# Patient Record
Sex: MALE | Race: BLACK OR AFRICAN AMERICAN | NOT HISPANIC OR LATINO | URBAN - METROPOLITAN AREA
[De-identification: names, ages, dates, MRNs, and addresses within clinical notes are randomized per-mention and may not be internally consistent; named-entity substitution may affect disease eponyms.]

---

## 2024-04-09 ENCOUNTER — EMERGENCY (EMERGENCY)
Facility: HOSPITAL | Age: 74
LOS: 1 days | Discharge: HOME | End: 2024-04-09
Attending: EMERGENCY MEDICINE | Admitting: EMERGENCY MEDICINE
Payer: MEDICARE

## 2024-04-09 VITALS
RESPIRATION RATE: 20 BRPM | SYSTOLIC BLOOD PRESSURE: 117 MMHG | DIASTOLIC BLOOD PRESSURE: 71 MMHG | TEMPERATURE: 98 F | HEART RATE: 63 BPM | OXYGEN SATURATION: 97 %

## 2024-04-09 VITALS
TEMPERATURE: 99 F | RESPIRATION RATE: 18 BRPM | SYSTOLIC BLOOD PRESSURE: 125 MMHG | HEART RATE: 54 BPM | DIASTOLIC BLOOD PRESSURE: 74 MMHG | OXYGEN SATURATION: 98 %

## 2024-04-09 PROCEDURE — 73590 X-RAY EXAM OF LOWER LEG: CPT | Mod: 26,RT,77

## 2024-04-09 PROCEDURE — 73590 X-RAY EXAM OF LOWER LEG: CPT | Mod: 26,RT

## 2024-04-09 PROCEDURE — 99284 EMERGENCY DEPT VISIT MOD MDM: CPT

## 2024-04-09 PROCEDURE — 73600 X-RAY EXAM OF ANKLE: CPT | Mod: 26,RT,59

## 2024-04-09 PROCEDURE — 73610 X-RAY EXAM OF ANKLE: CPT | Mod: 26,RT

## 2024-04-09 RX ORDER — ACETAMINOPHEN 500 MG
975 TABLET ORAL ONCE
Refills: 0 | Status: COMPLETED | OUTPATIENT
Start: 2024-04-09 | End: 2024-04-09

## 2024-04-09 RX ORDER — LIDOCAINE 4 G/100G
1 CREAM TOPICAL ONCE
Refills: 0 | Status: COMPLETED | OUTPATIENT
Start: 2024-04-09 | End: 2024-04-09

## 2024-04-09 RX ORDER — IBUPROFEN 200 MG
600 TABLET ORAL ONCE
Refills: 0 | Status: COMPLETED | OUTPATIENT
Start: 2024-04-09 | End: 2024-04-09

## 2024-04-09 RX ADMIN — Medication 975 MILLIGRAM(S): at 14:00

## 2024-04-09 RX ADMIN — Medication 600 MILLIGRAM(S): at 14:01

## 2024-04-09 RX ADMIN — LIDOCAINE 1 PATCH: 4 CREAM TOPICAL at 14:00

## 2024-04-09 NOTE — ED PROVIDER NOTE - PATIENT PORTAL LINK FT
You can access the FollowMyHealth Patient Portal offered by [facility] by registering at the following website: [email or web address]. By joining [employer]’s FollowMyHealth portal, you will also be able to view your health information using other applications (apps) compatible with our system.

## 2024-04-09 NOTE — ED PROVIDER NOTE - PHYSICAL EXAMINATION
Vital Signs Stable  Gen: well appearing, NAD  HEENT: no conjunctivitis  Cardiovascular: 2+ DP pulses b/l  Chest: unlabored breathing  Extremity: right medial malleolus posterior tip tenderness to palpation, right navicular tenderness, decreased dorsiflexion and plantarflexion ROM on right ankle, left ankle full ROM, 5/5 strength to dorsiflexion and plantarflexion b/l, good perfusion  Skin: no rash  Neuro: grossly normal

## 2024-04-09 NOTE — ED PROVIDER NOTE - CARE PROVIDER_API CALL
Royal Lee  Orthopaedic Surgery  [address]  [place] [address]  Phone: [phone]  Fax: [phone]  Follow Up Time: 4-6 Days    Kevin Abdi  Orthopaedic Surgery  [address]  [place] [address]  Phone: [phone]  Fax: [phone]  Follow Up Time: 4-6 Days

## 2024-04-09 NOTE — ED PROVIDER NOTE - PROVIDER TOKENS
PROVIDER:[TOKEN:[[id number]],FOLLOWUP:[4-6 Days]],PROVIDER:[TOKEN:[[id number]],FOLLOWUP:[4-6 Days]]

## 2024-04-09 NOTE — CONSULT NOTE ADULT - SUBJECTIVE AND OBJECTIVE BOX
Orthopedic Surgery Consult Note    73yMale p/w R ankle pain/deformity and inability to bear weight s/p mechanical fall at home where he twisted his ankle down 1 step. Denies headstrike/LOC. Denies numbness/tingling in the feet/toes. No other bone or joint complaints.     Vital Signs Last 24 Hrs  T(C): 37 (04-09-24 @ 16:26), Max: 37 (04-09-24 @ 16:26)  T(F): 98.6 (04-09-24 @ 16:26), Max: 98.6 (04-09-24 @ 16:26)  HR: 54 (04-09-24 @ 16:26) (54 - 63)  BP: 125/74 (04-09-24 @ 16:26) (117/71 - 125/74)  BP(mean): --  RR: 18 (04-09-24 @ 16:26) (18 - 20)  SpO2: 98% (04-09-24 @ 16:26) (97% - 98%)    Physical Exam  Gen: Nad  RLE:   Skin intact, +swelling, +ecchymosis  +TTP medial/lateral malleolus  Motor intact distally  SILT s/s/sp/dp/t  2+ DP    Secondary: No TTP over bony prominences. SILT b/l, ROM intact b/l. Distal pulses palpable.      Imaging:  XR showing R jabari ankle fracture    Procedure: Closed reduction performed followed by placement of a well padded trilam splint. Patient tolerated the procedure well. Post procedure imaging obtained and showed improved alignment. Post procedure the patient was NV intact.    A/P: 73yMale with R ankle fracture s/p closed reduction and splinting  - Pain control  - NWB RLE on affected extremity in splint  - Keep splint clean, dry and intact until follow up  - Cane/crutches/walker as needed  - Ice/elevation  - Follow up with Jesus Darby in 1 week

## 2024-04-09 NOTE — ED PROVIDER NOTE - OBJECTIVE STATEMENT
73-year-old male with PMH HTN (on amlodipine and ramipril), right rotator rotator cuff surgery presents with right ankle pain after stepping down a step at home and twisting his ankle.  Patient has been able to ambulate with difficulty afterwards and is hobbling on the right leg.  Denies numbness or tingling.  Not take any pain medicine today.  Patient has been taking an unknown antibiotic took 1 pill last night for right ear infection prescribed by his primary care doctor for presumed ear infection.  Patient traveled from [place] in [place] and returned last week.  Denies chest pain, shortness of breath, nausea, vomiting, history of blood clots, hemoptysis, hormone use, recent procedures.

## 2024-04-09 NOTE — ED ADULT NURSE NOTE - CHIEF COMPLAINT QUOTE
pt with right ankle pain, pt stated while coming down the stairs twist his leg and slowly went down did fall to the ground,    pt return from [place] 6 days ago, he has been taking antibody for right ear infection.

## 2024-04-09 NOTE — ED PROVIDER NOTE - PROGRESS NOTE DETAILS
Sonido Mendoza MD PGY-2: pain now 5/10. xray shows distal fibula fx. ortho consulted and aware. requesting tib/fib xrays, ordered. Sonido Mendoza MD PGY-2: ortho reduced ankle fracture and placed splint. f/u bree 1 week. crutches. clear to go home per ortho. WIll provide crutches and discharge. Sonido Mendoza MD PGY-2: Crutches and teaching provided. Pt was able to use crutches without difficulty. All questions answered.

## 2024-04-09 NOTE — ED ADULT TRIAGE NOTE - CHIEF COMPLAINT QUOTE
pt with right ankle pain, pt stated while coming down the stairs twist his leg and slowly went down did fall to the ground,    pt return from [place] 6 days ago, he has been taking antibody for right ear infection.

## 2024-04-09 NOTE — ED PROVIDER NOTE - ATTENDING CONTRIBUTION TO CARE
73-year-old male with PMH HTN (on amlodipine and ramipril), right rotator rotator cuff surgery presents with right ankle pain after stepping down a step at home and twisting his ankle.  Patient has been able to ambulate with difficulty afterwards and is hobbling on the right leg.  Denies numbness or tingling.  Not take any pain medicine today.  Patient has been taking an unknown antibiotic took 1 pill last night for right ear infection prescribed by his primary care doctor for presumed ear infection.  Patient traveled from [place] in [place] and returned last week.  Denies chest pain, shortness of breath, nausea, vomiting, history of blood clots, hemoptysis, hormone use, recent procedures.

## 2024-04-09 NOTE — ED PROVIDER NOTE - NSFOLLOWUPINSTRUCTIONS_ED_ALL_ED_FT
You were evaluated in the emergency department for right ankle pain.  Your x-ray showed a fracture.  You were seen by our orthopedic surgery team and your fracture was reduced and you were placed in a splint.  Please follow-up with the orthopedic surgery specialist in the clinic in 1 week.  Information is attached.  Please do not bear any weight on your right leg.  You may take Tylenol or ibuprofen for pain.  You may use ice to reduce swelling.  Keep your splint clean, dry, and intact until you see the doctor in the clinic.  Keep your leg elevated when you are resting at home.    Fracture    A fracture is a break in one of your bones. This can occur from a variety of injuries, especially traumatic ones. Symptoms include pain, bruising, or swelling. Do not use the injured limb. If a fracture is in one of the bones below your waist, do not put weight on that limb unless instructed to do so by your healthcare provider. Crutches or a cane may have been provided. A splint or cast may have been applied by your health care provider. Make sure to keep it dry and follow up with an orthopedist as instructed.    SEEK IMMEDIATE MEDICAL CARE IF YOU HAVE ANY OF THE FOLLOWING SYMPTOMS: numbness, tingling, increasing pain, or weakness in any part of the injured limb.

## 2024-04-09 NOTE — ED ADULT NURSE NOTE - OBJECTIVE STATEMENT
pt received to intake 10a, aox4. pt c/o right ankle injury today. pt twisted his ankle while walking.  appears in no acute distress.  + swelling noted.

## 2024-04-09 NOTE — ED PROVIDER NOTE - CARE PROVIDERS DIRECT ADDRESSES
[email or web address]

## 2024-04-09 NOTE — ED PROVIDER NOTE - CLINICAL SUMMARY MEDICAL DECISION MAKING FREE TEXT BOX
73yM hx HTN presents with right ankle pain after twisting it walking down a step at home at 10am. Exam shows right medial malleolus posterior tip tenderness to palpation, right navicular tenderness, decreased dorsiflexion and plantarflexion ROM on right ankle, left ankle full ROM, 5/5 strength to dorsiflexion and plantarflexion b/l, good perfusion. Will obtain right ankle xrays and give analgesia. Dispo pending results likley home.

## 2024-04-10 PROBLEM — Z00.00 ENCOUNTER FOR PREVENTIVE HEALTH EXAMINATION: Status: ACTIVE | Noted: 2024-04-10

## 2024-04-10 PROBLEM — I10 ESSENTIAL (PRIMARY) HYPERTENSION: Chronic | Status: ACTIVE | Noted: 2024-04-09

## 2024-04-15 ENCOUNTER — APPOINTMENT (OUTPATIENT)
Dept: ORTHOPEDIC SURGERY | Facility: CLINIC | Age: 74
End: 2024-04-15
Payer: MEDICARE

## 2024-04-15 ENCOUNTER — NON-APPOINTMENT (OUTPATIENT)
Age: 74
End: 2024-04-15

## 2024-04-15 VITALS — HEIGHT: 63 IN | BODY MASS INDEX: 28.35 KG/M2 | WEIGHT: 160 LBS

## 2024-04-15 DIAGNOSIS — S82.851A DISPLACED TRIMALLEOLAR FRACTURE OF RIGHT LOWER LEG, INITIAL ENCOUNTER FOR CLOSED FRACTURE: ICD-10-CM

## 2024-04-15 DIAGNOSIS — S82.843A DISPLACED BIMALLEOLAR FRACTURE OF UNSPECIFIED LOWER LEG, INITIAL ENCOUNTER FOR CLOSED FRACTURE: ICD-10-CM

## 2024-04-15 PROCEDURE — 29515 APPLICATION SHORT LEG SPLINT: CPT | Mod: RT

## 2024-04-15 PROCEDURE — 99203 OFFICE O/P NEW LOW 30 MIN: CPT | Mod: 25

## 2024-04-15 PROCEDURE — 73610 X-RAY EXAM OF ANKLE: CPT | Mod: RT

## 2024-04-15 NOTE — ADDENDUM
[FreeTextEntry1] :  I, Mya Sabillon wrote this note acting as a scribe for Dr. Chris Monaco on Apr 15, 2024.

## 2024-04-15 NOTE — END OF VISIT
[FreeTextEntry3] :  All medical record entries made by the Scribe were at my,  Dr. Chris Monaco MD., direction and personally dictated by me on 04/15/2024. I have personally reviewed the chart and agree that the record accurately reflects my personal performance of the history, physical exam, assessment and plan.

## 2024-04-15 NOTE — PHYSICAL EXAM
[de-identified] : Patient is WDWN, alert, and in no acute distress. Breathing is unlabored. He is grossly oriented to person, place, and time.  Patinet ambulates with crutches.   Right Ankle: Plaster splint is removed Inspection/Palpation: Tenderness and edema is present. medially and laterally Range of Motion: 0-15 Sensation is normal [de-identified] :  AP, lateral and oblique views of the Right Ankle were obtained today and revealed  a displaced Tri malleolar ankle fracture.   ACC: [id number] EXAM: XR ANKLE 2 VIEWS RT ORDERED BY: ARMANDO ROBBINS  ACC: [id number] EXAM: XR TIB FIB AP LAT 2 VIEWS RT ORDERED BY: ARMANDO ROBBINS PROCEDURE DATE: 04/09/2024 INTERPRETATION: CLINICAL INDICATION: Status post reduction EXAM: 3 views of the right ankle and 2 views of the right tibia/fibula COMPARISON: Radiograph some earlier the same day  IMPRESSION: Status post reduction and casting of medial malleolus and distal fibular fractures. Tibiotalar alignment is intact.  --- End of Report ---

## 2024-04-15 NOTE — HISTORY OF PRESENT ILLNESS
[de-identified] : Pt is a 74 y/o male with right ankle bimalleolar fracture.  He was walking outside and he twisted the ankle causing him to fall.  He had pain immediately.  He tried to stand but was unable to bear weight.  He called an ambulance and went to the ED.  The fracture was reduced and a splint was applied.  He was advised to follow up with a specialist. He is an occasional smoker. No diabetes. He was a  and is currently retired.

## 2024-04-15 NOTE — DISCUSSION/SUMMARY
[de-identified] :  The underlying pathophysiology was reviewed with the patient. XR films were reviewed with the patient. Discussed at length the nature of the patient's condition.   Patient was advised to take OTC medications and topical analgesic for pain management.   We discussed different forms of treatment which includes a cast vs surgery. I explained that given his amount of displacement and his lifestyle, surgery would be the best treatment to obtain optimal movement and function.  A new short leg fiberglass splint was applied The patient wishes to proceed with Risks and benefits discussed. Nature of the operation which is ORIF of RIGHT Tri malleolar ankle fracture reviewed. Post-operative recovery and patient experience were reviewed. Patient will be put in contact with my surgical coordinator. He was also informed that he needs to stop smoking since it slows down healing process.   Currently, the old plaster splint was removed, and a new fiberglass splint was placed on the right ankle to support the ankle till surgery.   All questions answered, understanding verbalized. Patient in agreement with plan of care.

## 2024-04-16 ENCOUNTER — OUTPATIENT (OUTPATIENT)
Dept: OUTPATIENT SERVICES | Facility: HOSPITAL | Age: 74
LOS: 1 days | End: 2024-04-16
Payer: MEDICARE

## 2024-04-16 ENCOUNTER — NON-APPOINTMENT (OUTPATIENT)
Age: 74
End: 2024-04-16

## 2024-04-16 VITALS
OXYGEN SATURATION: 99 % | HEART RATE: 68 BPM | DIASTOLIC BLOOD PRESSURE: 80 MMHG | RESPIRATION RATE: 14 BRPM | HEIGHT: 65 IN | TEMPERATURE: 97 F | WEIGHT: 164.91 LBS | SYSTOLIC BLOOD PRESSURE: 128 MMHG

## 2024-04-16 DIAGNOSIS — Z98.890 OTHER SPECIFIED POSTPROCEDURAL STATES: Chronic | ICD-10-CM

## 2024-04-16 DIAGNOSIS — Z91.89 OTHER SPECIFIED PERSONAL RISK FACTORS, NOT ELSEWHERE CLASSIFIED: ICD-10-CM

## 2024-04-16 DIAGNOSIS — Z01.818 ENCOUNTER FOR OTHER PREPROCEDURAL EXAMINATION: ICD-10-CM

## 2024-04-16 DIAGNOSIS — S82.851A DISPLACED TRIMALLEOLAR FRACTURE OF RIGHT LOWER LEG, INITIAL ENCOUNTER FOR CLOSED FRACTURE: ICD-10-CM

## 2024-04-16 LAB
ANION GAP SERPL CALC-SCNC: 7 MMOL/L — SIGNIFICANT CHANGE UP (ref 5–17)
BUN SERPL-MCNC: 14 MG/DL — SIGNIFICANT CHANGE UP (ref 7–23)
CALCIUM SERPL-MCNC: 9.5 MG/DL — SIGNIFICANT CHANGE UP (ref 8.4–10.5)
CHLORIDE SERPL-SCNC: 103 MMOL/L — SIGNIFICANT CHANGE UP (ref 96–108)
CO2 SERPL-SCNC: 28 MMOL/L — SIGNIFICANT CHANGE UP (ref 22–31)
CREAT SERPL-MCNC: 1.14 MG/DL — SIGNIFICANT CHANGE UP (ref 0.5–1.3)
EGFR: 69 ML/MIN/1.73M2 — SIGNIFICANT CHANGE UP
GLUCOSE SERPL-MCNC: 109 MG/DL — HIGH (ref 70–99)
HCT VFR BLD CALC: 34.7 % — LOW (ref 39–50)
HGB BLD-MCNC: 11.6 G/DL — LOW (ref 13–17)
MCHC RBC-ENTMCNC: 30 PG — SIGNIFICANT CHANGE UP (ref 27–34)
MCHC RBC-ENTMCNC: 33.4 GM/DL — SIGNIFICANT CHANGE UP (ref 32–36)
MCV RBC AUTO: 89.7 FL — SIGNIFICANT CHANGE UP (ref 80–100)
NRBC # BLD: 0 /100 WBCS — SIGNIFICANT CHANGE UP (ref 0–0)
PLATELET # BLD AUTO: 394 K/UL — SIGNIFICANT CHANGE UP (ref 150–400)
POTASSIUM SERPL-MCNC: 3.6 MMOL/L — SIGNIFICANT CHANGE UP (ref 3.5–5.3)
POTASSIUM SERPL-SCNC: 3.6 MMOL/L — SIGNIFICANT CHANGE UP (ref 3.5–5.3)
RBC # BLD: 3.87 M/UL — LOW (ref 4.2–5.8)
RBC # FLD: 12.8 % — SIGNIFICANT CHANGE UP (ref 10.3–14.5)
SODIUM SERPL-SCNC: 138 MMOL/L — SIGNIFICANT CHANGE UP (ref 135–145)
WBC # BLD: 7.81 K/UL — SIGNIFICANT CHANGE UP (ref 3.8–10.5)
WBC # FLD AUTO: 7.81 K/UL — SIGNIFICANT CHANGE UP (ref 3.8–10.5)

## 2024-04-16 PROCEDURE — G0463: CPT

## 2024-04-16 PROCEDURE — 93005 ELECTROCARDIOGRAM TRACING: CPT

## 2024-04-16 PROCEDURE — 36415 COLL VENOUS BLD VENIPUNCTURE: CPT

## 2024-04-16 PROCEDURE — 80048 BASIC METABOLIC PNL TOTAL CA: CPT

## 2024-04-16 PROCEDURE — 93010 ELECTROCARDIOGRAM REPORT: CPT

## 2024-04-16 PROCEDURE — 85027 COMPLETE CBC AUTOMATED: CPT

## 2024-04-16 RX ORDER — AMLODIPINE BESYLATE 2.5 MG/1
1 TABLET ORAL
Refills: 0 | DISCHARGE

## 2024-04-16 RX ORDER — RAMIPRIL 5 MG
1 CAPSULE ORAL
Refills: 0 | DISCHARGE

## 2024-04-16 NOTE — H&P PST ADULT - HISTORY OF PRESENT ILLNESS
This is a 74 y/o male who sustained right ankle fracture presents with right ankle pain , swelling and unable to bear weight . Ambulates with crutches .He twisted his ankle and fell  while he was walking outside on 4/9/24 / went to ER x ray showed trimalleolar fracture .Placed on splint  scheduled for ORIF right ankle trimalleolar fracture on 4/19/24

## 2024-04-16 NOTE — H&P PST ADULT - PROBLEM SELECTOR PLAN 1
scheduled for ORIF right ankle trimalleolar fracture on 4/19/24  will obtain medical clearance   Pre op instructions scheduled for ORIF right ankle trimalleolar fracture on 4/19/24  will obtain medical clearance   instructed to take Amlodipine on DOS   Pre op instructions

## 2024-04-16 NOTE — H&P PST ADULT - MUSCULOSKELETAL COMMENTS
right ankle trimalleolar fracture right trimalleolar fracture right ankle ; on splint ,tenderness on palpation , mild swelling noted  toes able to move , cap refill good,

## 2024-04-16 NOTE — H&P PST ADULT - NSICDXPASTMEDICALHX_GEN_ALL_CORE_FT
PAST MEDICAL HISTORY:  HTN (hypertension)     Trimalleolar fracture of right ankle      PAST MEDICAL HISTORY:  At risk for sleep apnea     HTN (hypertension)     Trimalleolar fracture of right ankle

## 2024-04-16 NOTE — H&P PST ADULT - NSICDXFAMILYHX_GEN_ALL_CORE_FT
FAMILY HISTORY:  Family history of cerebral hemorrhage    Mother  Still living? No  Family history of thyroid cancer, Age at diagnosis: Age Unknown    Sibling  Still living? Yes, Estimated age: 71-80  FHx: colon cancer, Age at diagnosis: Age Unknown

## 2024-04-25 ENCOUNTER — TRANSCRIPTION ENCOUNTER (OUTPATIENT)
Age: 74
End: 2024-04-25

## 2024-04-26 ENCOUNTER — TRANSCRIPTION ENCOUNTER (OUTPATIENT)
Age: 74
End: 2024-04-26

## 2024-04-26 ENCOUNTER — OUTPATIENT (OUTPATIENT)
Dept: OUTPATIENT SERVICES | Facility: HOSPITAL | Age: 74
LOS: 1 days | End: 2024-04-26
Payer: MEDICARE

## 2024-04-26 ENCOUNTER — APPOINTMENT (OUTPATIENT)
Dept: ORTHOPEDIC SURGERY | Facility: HOSPITAL | Age: 74
End: 2024-04-26

## 2024-04-26 VITALS
DIASTOLIC BLOOD PRESSURE: 75 MMHG | HEART RATE: 64 BPM | RESPIRATION RATE: 15 BRPM | OXYGEN SATURATION: 99 % | SYSTOLIC BLOOD PRESSURE: 125 MMHG

## 2024-04-26 VITALS
WEIGHT: 147.71 LBS | HEIGHT: 63 IN | SYSTOLIC BLOOD PRESSURE: 136 MMHG | DIASTOLIC BLOOD PRESSURE: 75 MMHG | TEMPERATURE: 98 F | OXYGEN SATURATION: 100 % | RESPIRATION RATE: 16 BRPM | HEART RATE: 53 BPM

## 2024-04-26 DIAGNOSIS — S82.851A DISPLACED TRIMALLEOLAR FRACTURE OF RIGHT LOWER LEG, INITIAL ENCOUNTER FOR CLOSED FRACTURE: ICD-10-CM

## 2024-04-26 DIAGNOSIS — Z98.890 OTHER SPECIFIED POSTPROCEDURAL STATES: Chronic | ICD-10-CM

## 2024-04-26 DIAGNOSIS — Z01.818 ENCOUNTER FOR OTHER PREPROCEDURAL EXAMINATION: ICD-10-CM

## 2024-04-26 PROCEDURE — C1713: CPT

## 2024-04-26 PROCEDURE — 76000 FLUOROSCOPY <1 HR PHYS/QHP: CPT

## 2024-04-26 PROCEDURE — C1769: CPT

## 2024-04-26 PROCEDURE — 97161 PT EVAL LOW COMPLEX 20 MIN: CPT

## 2024-04-26 PROCEDURE — 27822 TREATMENT OF ANKLE FRACTURE: CPT | Mod: AS,RT

## 2024-04-26 PROCEDURE — 27822 TREATMENT OF ANKLE FRACTURE: CPT | Mod: RT

## 2024-04-26 PROCEDURE — 27848 TREAT ANKLE DISLOCATION: CPT | Mod: AS,RT

## 2024-04-26 DEVICE — PLATE 1/3 TUB LCP W/COLLAR 7H 81MM: Type: IMPLANTABLE DEVICE | Site: RIGHT | Status: FUNCTIONAL

## 2024-04-26 DEVICE — IMPLANTABLE DEVICE: Type: IMPLANTABLE DEVICE | Site: RIGHT | Status: FUNCTIONAL

## 2024-04-26 DEVICE — KWIRE TRC PT 2X150MM: Type: IMPLANTABLE DEVICE | Site: RIGHT | Status: FUNCTIONAL

## 2024-04-26 DEVICE — SCREW CORT S-T 3.5X10MM: Type: IMPLANTABLE DEVICE | Site: RIGHT | Status: FUNCTIONAL

## 2024-04-26 DEVICE — SCREW LOKG SLF-TPNG W/ STARDRIVE RECESS 3.5X16MM: Type: IMPLANTABLE DEVICE | Site: RIGHT | Status: FUNCTIONAL

## 2024-04-26 DEVICE — GUIDEWIRE THREADED 1.25 X 150MM: Type: IMPLANTABLE DEVICE | Site: RIGHT | Status: FUNCTIONAL

## 2024-04-26 DEVICE — SCREW CORTEX S-T 2.7X10MM: Type: IMPLANTABLE DEVICE | Site: RIGHT | Status: FUNCTIONAL

## 2024-04-26 DEVICE — SCREW LOKG SLF-TPNG W/ STARDRIVE RECESS 3.5X14MM: Type: IMPLANTABLE DEVICE | Site: RIGHT | Status: FUNCTIONAL

## 2024-04-26 RX ORDER — CEFAZOLIN SODIUM 1 G
2000 VIAL (EA) INJECTION ONCE
Refills: 0 | Status: COMPLETED | OUTPATIENT
Start: 2024-04-26 | End: 2024-04-26

## 2024-04-26 RX ORDER — SODIUM CHLORIDE 9 MG/ML
1000 INJECTION, SOLUTION INTRAVENOUS
Refills: 0 | Status: DISCONTINUED | OUTPATIENT
Start: 2024-04-26 | End: 2024-04-26

## 2024-04-26 RX ORDER — CHLORHEXIDINE GLUCONATE 213 G/1000ML
1 SOLUTION TOPICAL ONCE
Refills: 0 | Status: COMPLETED | OUTPATIENT
Start: 2024-04-26 | End: 2024-04-26

## 2024-04-26 RX ORDER — ONDANSETRON 8 MG/1
4 TABLET, FILM COATED ORAL ONCE
Refills: 0 | Status: DISCONTINUED | OUTPATIENT
Start: 2024-04-26 | End: 2024-04-26

## 2024-04-26 RX ORDER — IBUPROFEN 200 MG
1 TABLET ORAL
Qty: 60 | Refills: 0
Start: 2024-04-26

## 2024-04-26 RX ORDER — APREPITANT 80 MG/1
40 CAPSULE ORAL ONCE
Refills: 0 | Status: COMPLETED | OUTPATIENT
Start: 2024-04-26 | End: 2024-04-26

## 2024-04-26 RX ORDER — OXYCODONE AND ACETAMINOPHEN 5; 325 MG/1; MG/1
1 TABLET ORAL
Qty: 10 | Refills: 0
Start: 2024-04-26

## 2024-04-26 RX ORDER — HYDROMORPHONE HYDROCHLORIDE 2 MG/ML
0.5 INJECTION INTRAMUSCULAR; INTRAVENOUS; SUBCUTANEOUS
Refills: 0 | Status: DISCONTINUED | OUTPATIENT
Start: 2024-04-26 | End: 2024-04-26

## 2024-04-26 RX ORDER — OXYCODONE HYDROCHLORIDE 5 MG/1
5 TABLET ORAL ONCE
Refills: 0 | Status: DISCONTINUED | OUTPATIENT
Start: 2024-04-26 | End: 2024-04-26

## 2024-04-26 RX ORDER — ACETAMINOPHEN 500 MG
1000 TABLET ORAL ONCE
Refills: 0 | Status: COMPLETED | OUTPATIENT
Start: 2024-04-26 | End: 2024-04-26

## 2024-04-26 RX ADMIN — SODIUM CHLORIDE 75 MILLILITER(S): 9 INJECTION, SOLUTION INTRAVENOUS at 15:51

## 2024-04-26 RX ADMIN — CHLORHEXIDINE GLUCONATE 1 APPLICATION(S): 213 SOLUTION TOPICAL at 11:39

## 2024-04-26 RX ADMIN — APREPITANT 40 MILLIGRAM(S): 80 CAPSULE ORAL at 11:40

## 2024-04-26 NOTE — PHYSICAL THERAPY INITIAL EVALUATION ADULT - ADDITIONAL COMMENTS
Pt resides in pvt home with spouse, available to assist as needed upon d/c. Pt states 3STE +HR, full flight +HR to second floor. Pt has crutches, edu on knee scooter. Pt reports was independent prior to admission with use of crutches since injury approx 2 weeks ago. Pt states comfortable/confident with crutches NWB.

## 2024-04-26 NOTE — ASU DISCHARGE PLAN (ADULT/PEDIATRIC) - CARE PROVIDER_API CALL
Chris Monaco  Orthopaedic Surgery  [address]  [place] [address]  Phone: [phone]  Fax: [phone]  Follow Up Time: 2 weeks

## 2024-04-26 NOTE — ASU DISCHARGE PLAN (ADULT/PEDIATRIC) - ASU DC SPECIAL INSTRUCTIONSFT
NON -weight bearing right lower extremity  ice  elevate  wiggle toes  Keep area clean and dry   Follow up Dr. Monaco  call office to confirm appt  Narcotic pain medications as needed NON -weight bearing right lower extremity  ice  elevate  wiggle toes  Keep area clean and dry   Follow up Dr. Monaco  call office to confirm appt with  in 10 days  Narcotic pain medications as needed

## 2024-04-26 NOTE — PHYSICAL THERAPY INITIAL EVALUATION ADULT - RANGE OF MOTION EXAMINATION, REHAB EVAL
R LE secondary to seen pre-operatively/bilateral upper extremity ROM was WFL (within functional limits)/Left LE ROM was WFL (within functional limits)/deficits as listed below

## 2024-04-26 NOTE — PHYSICAL THERAPY INITIAL EVALUATION ADULT - PERTINENT HX OF CURRENT PROBLEM, REHAB EVAL
pt is a 72 y/o M seen pre-operatively for open reduction internal fixation of right ankle trimalleolar fracture 4/26/24

## 2024-04-26 NOTE — ASU PATIENT PROFILE, ADULT - NSICDXPASTMEDICALHX_GEN_ALL_CORE_FT
PAST MEDICAL HISTORY:  At risk for sleep apnea     HTN (hypertension)     Trimalleolar fracture of right ankle

## 2024-04-26 NOTE — ASU PATIENT PROFILE, ADULT - FALL HARM RISK - RISK INTERVENTIONS

## 2024-04-26 NOTE — PHYSICAL THERAPY INITIAL EVALUATION ADULT - NSACTIVITYREC_GEN_A_PT
Pt educated on R LE NWB. Pt independent in transfers, and amb with crutches. Pt edu/instructed on stair negotiation simulation with HR/crutches, deferring at this time stating has been independent since injury approx 2 weeks ago; edu on going up/down on bottom. Pt has crutches. Pt verbalized family available to assist as needed upon d/c. Pt questions answered to pt satisfaction, verbalized understanding/agreement to edu/recommendations provided. Pt education handout issued. Pt cleared from PT services, no skilled PT needs upon d/c.

## 2024-04-26 NOTE — ASU DISCHARGE PLAN (ADULT/PEDIATRIC) - NS MD DC FALL RISK RISK
For information on Fall & Injury Prevention, visit: [email or web address] OR  [email or web address] OR  https://www.cdc.gov/steadi/patient.html

## 2024-04-29 PROBLEM — Z91.89 OTHER SPECIFIED PERSONAL RISK FACTORS, NOT ELSEWHERE CLASSIFIED: Chronic | Status: ACTIVE | Noted: 2024-04-16

## 2024-04-29 PROBLEM — S82.851A DISPLACED TRIMALLEOLAR FRACTURE OF RIGHT LOWER LEG, INITIAL ENCOUNTER FOR CLOSED FRACTURE: Chronic | Status: ACTIVE | Noted: 2024-04-16

## 2024-05-06 ENCOUNTER — APPOINTMENT (OUTPATIENT)
Dept: ORTHOPEDIC SURGERY | Facility: CLINIC | Age: 74
End: 2024-05-06
Payer: MEDICARE

## 2024-05-06 VITALS — BODY MASS INDEX: 28.35 KG/M2 | HEIGHT: 63 IN | WEIGHT: 160 LBS

## 2024-05-06 PROCEDURE — 99024 POSTOP FOLLOW-UP VISIT: CPT

## 2024-05-06 PROCEDURE — 29405 APPL SHORT LEG CAST: CPT | Mod: 58,RT

## 2024-05-06 PROCEDURE — 73610 X-RAY EXAM OF ANKLE: CPT | Mod: RT

## 2024-05-06 NOTE — HISTORY OF PRESENT ILLNESS
[de-identified] : 1st POA s/p ORIF of right ankle trimalleolar fracture [de-identified] : The patient is a 73 year male who returns for the 1st postoperative visit after undergoing ORIF of right ankle trimalleolar fracture at [facility]. The surgery was on 04/29/2024. The patient is recovering at home. He reports mild postoperative pain. [de-identified] :  Patient is WDWN, alert, and in no acute distress. Breathing is unlabored. He is grossly oriented to person, place, and time.   Right Ankle:   Incision is healing well. There are no signs of infection. Sutures were removed. Normal amount of postoperative edema and tenderness present. [de-identified] :  AP, lateral and oblique views of the Right Ankle were obtained today and revealed Trimalleolar fracture that is well aligned with 7-hole 1/3 tubular plate with locking and nonlocking screws and a 4-0 cannulated screw with washer for the medial malleolus.  [de-identified] :  The underlying pathophysiology was reviewed with the patient. XR films were reviewed with the patient. Discussed at length the nature of the patient's condition.   Patient was advised to take OTC medications and topical analgesic for pain management.    The sutures were removed. Post-surgical treatment options were discussed including: observation, NSAIDS, and cast immobilization.  A ankle cast was applied. Cast care instructions were reviewed. Gentle ROM exercises in the cast were advised.  NSAIDS as tolerated.  A post op shoe was provided for the patient for added support.   All questions answered, understanding verbalized. Patient in agreement with plan of care.   Patient was advised to follow up in 4 weeks for cast removal and repeat x-rays.

## 2024-05-06 NOTE — ADDENDUM
[FreeTextEntry1] :  I, Mya Sabillon wrote this note acting as a scribe for Dr. Chris Monaco on May 06, 2024.

## 2024-05-06 NOTE — END OF VISIT
[FreeTextEntry3] :  All medical record entries made by the Scribe were at my,  Dr. Chris Monaco MD., direction and personally dictated by me on 05/06/2024. I have personally reviewed the chart and agree that the record accurately reflects my personal performance of the history, physical exam, assessment and plan.

## 2024-06-13 ENCOUNTER — APPOINTMENT (OUTPATIENT)
Dept: ORTHOPEDIC SURGERY | Facility: CLINIC | Age: 74
End: 2024-06-13
Payer: MEDICARE

## 2024-06-13 VITALS — BODY MASS INDEX: 28.35 KG/M2 | WEIGHT: 160 LBS | HEIGHT: 63 IN

## 2024-06-13 DIAGNOSIS — S82.853A DISPLACED TRIMALLEOLAR FRACTURE OF UNSPECIFIED LOWER LEG, INITIAL ENCOUNTER FOR CLOSED FRACTURE: ICD-10-CM

## 2024-06-13 PROCEDURE — 73610 X-RAY EXAM OF ANKLE: CPT | Mod: RT

## 2024-06-13 PROCEDURE — 99024 POSTOP FOLLOW-UP VISIT: CPT

## 2024-07-25 ENCOUNTER — APPOINTMENT (OUTPATIENT)
Dept: ORTHOPEDIC SURGERY | Facility: CLINIC | Age: 74
End: 2024-07-25
Payer: MEDICARE

## 2024-07-25 VITALS — HEIGHT: 62 IN | BODY MASS INDEX: 28.52 KG/M2 | WEIGHT: 155 LBS

## 2024-07-25 DIAGNOSIS — S82.851A DISPLACED TRIMALLEOLAR FRACTURE OF RIGHT LOWER LEG, INITIAL ENCOUNTER FOR CLOSED FRACTURE: ICD-10-CM

## 2024-07-25 PROCEDURE — 99024 POSTOP FOLLOW-UP VISIT: CPT

## 2024-07-25 NOTE — HISTORY OF PRESENT ILLNESS
[de-identified] : s/p ORIF of right ankle trimalleolar fracture, 3rd postoperative visit [de-identified] :  Strength: Not assessed due to recent surgery. Neurovascular: Intact distal pulses, capillary refill less than 2 seconds. Sensation intact. CONSERVATIVE METHOD: Patient was advised to continue following postoperative care instructions and to use OTC medications for pain management as needed.  [de-identified] : Inspection/Palpation: Incision is healing well. Mild swelling present. One stitch noted not completely removed. No signs of infection. Range of Motion: Limited due to recent surgery. Strength: Not assessed due to recent surgery. Neurovascular: Intact distal pulses, capillary refill less than 2 seconds. Sensation intact. CONSERVATIVE METHOD: Patient was advised to continue following postoperative care instructions and to use OTC medications for pain management as needed.  [de-identified] : no images done today [de-identified] : The underlying pathophysiology was reviewed with the patient. The incomplete stitch was carefully removed in the office today. The importance of monitoring the incision site for signs of infection and adhering to postoperative care instructions was emphasized. The patient was advised to keep the area clean and dry, and to continue using support as recommended.  follow up as needed

## 2024-07-25 NOTE — HISTORY OF PRESENT ILLNESS
[de-identified] : s/p ORIF of right ankle trimalleolar fracture, 3rd postoperative visit [de-identified] :  Strength: Not assessed due to recent surgery. Neurovascular: Intact distal pulses, capillary refill less than 2 seconds. Sensation intact. CONSERVATIVE METHOD: Patient was advised to continue following postoperative care instructions and to use OTC medications for pain management as needed.  [de-identified] : Inspection/Palpation: Incision is healing well. Mild swelling present. One stitch noted not completely removed. No signs of infection. Range of Motion: Limited due to recent surgery. Strength: Not assessed due to recent surgery. Neurovascular: Intact distal pulses, capillary refill less than 2 seconds. Sensation intact. CONSERVATIVE METHOD: Patient was advised to continue following postoperative care instructions and to use OTC medications for pain management as needed.  [de-identified] : no images done today [de-identified] : The underlying pathophysiology was reviewed with the patient. The incomplete stitch was carefully removed in the office today. The importance of monitoring the incision site for signs of infection and adhering to postoperative care instructions was emphasized. The patient was advised to keep the area clean and dry, and to continue using support as recommended.  follow up as needed

## 2025-05-28 NOTE — ED ADULT NURSE NOTE - NSFALLOOBATTEMPT_ED_ALL_ED
No

## (undated) DEVICE — DRAPE 3/4 SHEET 52X76"

## (undated) DEVICE — PACK BASIC TIBURON LTXF STRL

## (undated) DEVICE — PACK LOWER EXTREMITY

## (undated) DEVICE — Device

## (undated) DEVICE — WARMING BLANKET UPPER ADULT

## (undated) DEVICE — DRSG MASTISOL

## (undated) DEVICE — TOURNIQUET ESMARK 4"

## (undated) DEVICE — SUT MONOCRYL 4-0 18" P-3 UNDYED

## (undated) DEVICE — DRILL BIT SYNTHES ORTHO QC 2.5X110MM

## (undated) DEVICE — DRAPE C ARM 41X140"

## (undated) DEVICE — DRILL BIT SYNTHES ORTHO QC 3FLUTE 2.7X125MM

## (undated) DEVICE — SUT VICRYL 3-0 27" RB-1 UNDYED

## (undated) DEVICE — ELCTR STRYKER NEPTUNE SMOKE EVACUATION PENCIL (GREEN)

## (undated) DEVICE — DRSG DERMABOND PRINEO 60CM

## (undated) DEVICE — DRSG CURITY GAUZE SPONGE 4 X 4" 12-PLY

## (undated) DEVICE — DRILL BIT SYNTHES ORTHO CANN QC 2.7X160MM

## (undated) DEVICE — SUT POLYSORB 2-0 30" GS-11 UNDYED

## (undated) DEVICE — DRSG STERISTRIPS 0.5 X 4"

## (undated) DEVICE — TOURNIQUET CUFF 34" SINGLE PORT W PLC (BLACK)

## (undated) DEVICE — VENODYNE/SCD SLEEVE CALF MEDIUM

## (undated) DEVICE — POSITIONER STRAP ARMBOARD VELCRO TS-30